# Patient Record
Sex: FEMALE | Race: WHITE | NOT HISPANIC OR LATINO | Employment: FULL TIME | URBAN - METROPOLITAN AREA
[De-identification: names, ages, dates, MRNs, and addresses within clinical notes are randomized per-mention and may not be internally consistent; named-entity substitution may affect disease eponyms.]

---

## 2024-03-23 ENCOUNTER — APPOINTMENT (OUTPATIENT)
Dept: GENERAL RADIOLOGY | Facility: HOSPITAL | Age: 24
End: 2024-03-23
Payer: COMMERCIAL

## 2024-03-23 ENCOUNTER — HOSPITAL ENCOUNTER (EMERGENCY)
Facility: HOSPITAL | Age: 24
Discharge: HOME | End: 2024-03-23
Attending: EMERGENCY MEDICINE
Payer: COMMERCIAL

## 2024-03-23 VITALS
OXYGEN SATURATION: 98 % | DIASTOLIC BLOOD PRESSURE: 79 MMHG | BODY MASS INDEX: 23.55 KG/M2 | HEIGHT: 62 IN | RESPIRATION RATE: 16 BRPM | HEART RATE: 88 BPM | TEMPERATURE: 98.4 F | SYSTOLIC BLOOD PRESSURE: 116 MMHG | WEIGHT: 128 LBS

## 2024-03-23 DIAGNOSIS — S92.911A: Primary | ICD-10-CM

## 2024-03-23 PROCEDURE — 99283 EMERGENCY DEPT VISIT LOW MDM: CPT | Performed by: EMERGENCY MEDICINE

## 2024-03-23 PROCEDURE — 99283 EMERGENCY DEPT VISIT LOW MDM: CPT

## 2024-03-23 PROCEDURE — 73630 X-RAY EXAM OF FOOT: CPT

## 2024-03-23 RX ORDER — IBUPROFEN 400 MG/1
400 TABLET ORAL ONCE
Status: DISCONTINUED | OUTPATIENT
Start: 2024-03-23 | End: 2024-03-23 | Stop reason: HOSPADM

## 2024-03-23 RX ORDER — NAPROXEN 500 MG/1
500 TABLET ORAL 2 TIMES DAILY PRN
Qty: 10 TABLET | Refills: 0 | Status: SHIPPED | OUTPATIENT
Start: 2024-03-23

## 2024-03-23 NOTE — DISCHARGE INSTRUCTIONS
Ice for the next 72 hours.  Continue buddy taping for 3 weeks.  Postop shoe as needed for comfort and walking.  Naproxen for pain and inflammation.

## 2024-03-23 NOTE — Clinical Note
[facility]  [address]  [place] [address]    Colette Kumari was seen and treated in our emergency department on 3/23/2024.  She may return to work on 03/25/2024.         Thank you for choosing [facility].    Terrell Devine MD

## 2024-03-23 NOTE — FSED PROVIDER NOTE
Subjective   History of Present Illness  Patient was playing bare pung and struck her toe against another player's shoe she was not wearing a shoe.      Review of Systems   All other systems reviewed and are negative.      History reviewed. No pertinent past medical history.    No Known Allergies    History reviewed. No pertinent surgical history.    History reviewed. No pertinent family history.    Social History     Socioeconomic History    Marital status: Single           Objective   Physical Exam  Vitals and nursing note reviewed.   Constitutional:       General: She is not in acute distress.     Appearance: She is not ill-appearing, toxic-appearing or diaphoretic.   HENT:      Head: Normocephalic and atraumatic.      Nose: Nose normal.   Eyes:      Pupils: Pupils are equal, round, and reactive to light.   Pulmonary:      Effort: Pulmonary effort is normal. No respiratory distress.   Musculoskeletal:         General: Tenderness, deformity and signs of injury present. Normal range of motion.      Comments: Right toe deformity no laceration good cap refill to the foot nontender foot   Skin:     General: Skin is warm and dry.      Capillary Refill: Capillary refill takes less than 2 seconds.   Neurological:      Mental Status: She is alert.         Procedures           ED Course                                           Medical Decision Making  Procedural note patient had her total reduced by simply exaggerating the dislocation.  The patient had a fracture from this dislocation and it still remains its a small sliver of bone at the inner phalangeal joint of that toe.  Successful reduction on post film nohemi taped to the other toe the second toe and a postop shoe ice and elevation instructions naproxen for pain    Amount and/or Complexity of Data Reviewed  Radiology: ordered.     Details: Right third toe PIP dislocation and avulsion fracture.  Successful reduction on post film        Final diagnoses:   Closed  fracture dislocation of toe of right foot, initial encounter       ED Disposition  ED Disposition       ED Disposition   Discharge    Condition   Stable    Comment   --               Provider, No Known  [place] [id number] [phone]    In 1 week           Medication List        New Prescriptions      naproxen 500 MG EC tablet  Commonly known as: EC NAPROSYN  Take 1 tablet by mouth 2 (Two) Times a Day As Needed for Mild Pain.               Where to Get Your Medications        These medications were sent to [facility] [id number] - [place], [place] - [address] AT [facility] [address] - [phone]  - [phone]   [address], [facility] [address]      Phone: [phone]   naproxen 500 MG EC tablet

## 2024-03-23 NOTE — Clinical Note
[facility]  [address]  [place] [address]    Colette Kumari was seen and treated in our emergency department on 3/23/2024.  She may return to work on 03/25/2024.         Thank you for choosing [facility].    Terrell Devine MD